# Patient Record
Sex: MALE | Race: OTHER | NOT HISPANIC OR LATINO | URBAN - METROPOLITAN AREA
[De-identification: names, ages, dates, MRNs, and addresses within clinical notes are randomized per-mention and may not be internally consistent; named-entity substitution may affect disease eponyms.]

---

## 2024-01-11 ENCOUNTER — EMERGENCY (EMERGENCY)
Facility: HOSPITAL | Age: 44
LOS: 1 days | Discharge: HOME | End: 2024-01-11
Attending: EMERGENCY MEDICINE | Admitting: EMERGENCY MEDICINE
Payer: SELF-PAY

## 2024-01-11 VITALS
DIASTOLIC BLOOD PRESSURE: 60 MMHG | SYSTOLIC BLOOD PRESSURE: 102 MMHG | HEIGHT: 67 IN | RESPIRATION RATE: 16 BRPM | HEART RATE: 84 BPM | WEIGHT: 164.91 LBS | TEMPERATURE: 98 F | OXYGEN SATURATION: 96 %

## 2024-01-11 PROCEDURE — 99283 EMERGENCY DEPT VISIT LOW MDM: CPT

## 2024-01-11 NOTE — ED PROVIDER NOTE - OBJECTIVE STATEMENT
Patient is a 43 yr-old male who was found at Patient is a 43 yr-old male who was found at [facility] with altered mental status.   Upon my initial evaluation he would not wake up with verbal stimulation, but after we started to take off his jacket he started yelling and screaming at us that he has no problems and to just leave him alone.  Currently he is not allowing of full examination.  He is asking to be left alone.  Unclear if he has any significant past medical history.  Initially the patient was refusing a fingerstick.  After he fell asleep we were able to get a fingerstick.    PMH: Unknown  Meds: Unknown  Allergies: Unknown Patient is a 43 yr-old male who was found at [facility] with altered mental status.   Upon my initial evaluation he would not wake up with verbal stimulation, but after we started to take off his jacket he started yelling and screaming at us that he has no problems and to just leave him alone.  Currently he is not allowing of full examination.  He is asking to be left alone.  Unclear if he has any significant past medical history.  Initially the patient was refusing a fingerstick.  After he fell asleep we were able to get a fingerstick.    PMH: Unknown  Meds: Unknown  Allergies: Unknown

## 2024-01-11 NOTE — ED ADULT NURSE NOTE - OBJECTIVE STATEMENT
Pt alert to verbal stimuli. Pt endorsing etoh use today. Denies drug use. Denies falls or trauma. No wounds noted.

## 2024-01-11 NOTE — ED ADULT TRIAGE NOTE - CHIEF COMPLAINT QUOTE
Pt BIBA from [facility] with AMS for suspected alcohol intoxication. Pt responsive to pain. Breathing even and unlabored. No signs of injury. Pt BIBA from [facility] with AMS for suspected alcohol intoxication. Pt responsive to pain. Breathing even and unlabored. No signs of injury.

## 2024-01-11 NOTE — ED ADULT NURSE NOTE - CHIEF COMPLAINT QUOTE
Pt BIBA from [facility] with AMS for suspected alcohol intoxication. Pt responsive to pain. Breathing even and unlabored. No signs of injury. Pt BIBA from [facility] with AMS for suspected alcohol intoxication. Pt responsive to pain. Breathing even and unlabored. No signs of injury.

## 2024-01-11 NOTE — ED PROVIDER NOTE - PHYSICAL EXAMINATION
General: Patient is sleeping in stretcher; scent of marijuana in his room; not allowing a full exam--took a swing at one of the nursing aids  Head: NC/AT  Eyes: EOMI, PERRL at 2mm each  Nose: Normal  Neck: Normal ROM  Lungs: CTA bilaterally, Normal respiratory effort  Heart: RRR  Neuro: Awakes with tactile stimulation  Skin: Unable to assess

## 2024-01-11 NOTE — ED ADULT NURSE NOTE - NSFALLRISKINTERV_ED_ALL_ED
Assistance OOB with selected safe patient handling equipment if applicable/Assistance with ambulation/Communicate fall risk and risk factors to all staff, patient, and family/Monitor gait and stability/Monitor for mental status changes and reorient to person, place, and time, as needed/Provide visual cue: yellow wristband, yellow gown, etc/Reinforce activity limits and safety measures with patient and family/Toileting schedule using arm’s reach rule for commode and bathroom/Use of alarms - bed, stretcher, chair and/or video monitoring/Call bell, personal items and telephone in reach/Instruct patient to call for assistance before getting out of bed/chair/stretcher/Non-slip footwear applied when patient is off stretcher/[place] to call system/Physically safe environment - no spills, clutter or unnecessary equipment/Purposeful Proactive Rounding/Room/bathroom lighting operational, light cord in reach Assistance OOB with selected safe patient handling equipment if applicable/Assistance with ambulation/Communicate fall risk and risk factors to all staff, patient, and family/Monitor gait and stability/Monitor for mental status changes and reorient to person, place, and time, as needed/Provide visual cue: yellow wristband, yellow gown, etc/Reinforce activity limits and safety measures with patient and family/Toileting schedule using arm’s reach rule for commode and bathroom/Use of alarms - bed, stretcher, chair and/or video monitoring/Call bell, personal items and telephone in reach/Instruct patient to call for assistance before getting out of bed/chair/stretcher/Non-slip footwear applied when patient is off stretcher/[place] to call system/Physically safe environment - no spills, clutter or unnecessary equipment/Purposeful Proactive Rounding/Room/bathroom lighting operational, light cord in reach

## 2024-01-11 NOTE — ED PROVIDER NOTE - PROGRESS NOTE DETAILS
[place]:  Pt got out of stretcher.  States he is leaving now.  No ataxia.  Refused to sign d/c papers.

## 2024-01-11 NOTE — ED PROVIDER NOTE - CLINICAL SUMMARY MEDICAL DECISION MAKING FREE TEXT BOX
Patient is a 43 yr-old male who was found at [facility] with altered mental status.   Upon my initial evaluation he would not wake up with verbal stimulation, but after we started to take off his jacket he started yelling and screaming at us that he has no problems and to just leave him alone.  Currently he is not allowing of full examination.  He is asking to be left alone.  Unclear if he has any significant past medical history.  Initially the patient was refusing a fingerstick.  After he fell asleep we were able to get a fingerstick.  Poor historian  Not allowing full exam; try to hit one of the techs when attempted to remove sweater  I suspect alcohol intoxication and maybe opioid use as pupils very small  Will observe for now Patient is a 43 yr-old male who was found at [facility] with altered mental status.   Upon my initial evaluation he would not wake up with verbal stimulation, but after we started to take off his jacket he started yelling and screaming at us that he has no problems and to just leave him alone.  Currently he is not allowing of full examination.  He is asking to be left alone.  Unclear if he has any significant past medical history.  Initially the patient was refusing a fingerstick.  After he fell asleep we were able to get a fingerstick.  Poor historian  Not allowing full exam; try to hit one of the techs when attempted to remove sweater  I suspect alcohol intoxication and maybe opioid use as pupils very small  Will observe for now Patient is a 43 yr-old male who was found at [facility] with altered mental status.   Upon my initial evaluation he would not wake up with verbal stimulation, but after we started to take off his jacket he started yelling and screaming at us that he has no problems and to just leave him alone.  Currently he is not allowing of full examination.  He is asking to be left alone.  Unclear if he has any significant past medical history.  Initially the patient was refusing a fingerstick.  After he fell asleep we were able to get a fingerstick.  Poor historian  Not allowing full exam; try to hit one of the techs when attempted to remove sweater  I suspect alcohol intoxication and maybe opioid use as pupils very small  Will observe for now  See progress note(s) Patient is a 43 yr-old male who was found at [facility] with altered mental status.   Upon my initial evaluation he would not wake up with verbal stimulation, but after we started to take off his jacket he started yelling and screaming at us that he has no problems and to just leave him alone.  Currently he is not allowing of full examination.  He is asking to be left alone.  Unclear if he has any significant past medical history.  Initially the patient was refusing a fingerstick.  After he fell asleep we were able to get a fingerstick.  Poor historian  Not allowing full exam; try to hit one of the techs when attempted to remove sweater  I suspect alcohol intoxication and maybe opioid use as pupils very small  Will observe for now  See progress note(s)

## 2024-01-15 DIAGNOSIS — F10.129 ALCOHOL ABUSE WITH INTOXICATION, UNSPECIFIED: ICD-10-CM

## 2024-01-15 DIAGNOSIS — Z53.29 PROCEDURE AND TREATMENT NOT CARRIED OUT BECAUSE OF PATIENT'S DECISION FOR OTHER REASONS: ICD-10-CM

## 2024-01-15 DIAGNOSIS — R41.82 ALTERED MENTAL STATUS, UNSPECIFIED: ICD-10-CM
